# Patient Record
Sex: MALE | Race: WHITE | Employment: FULL TIME | URBAN - NONMETROPOLITAN AREA
[De-identification: names, ages, dates, MRNs, and addresses within clinical notes are randomized per-mention and may not be internally consistent; named-entity substitution may affect disease eponyms.]

---

## 2017-02-28 ENCOUNTER — HOSPITAL ENCOUNTER (OUTPATIENT)
Dept: OCCUPATIONAL THERAPY | Age: 47
Discharge: HOME | End: 2017-02-28
Attending: PHYSICIAN ASSISTANT | Admitting: PHYSICIAN ASSISTANT

## 2017-02-28 LAB
ALBUMIN SERPL-MCNC: 4.1 GM/DL (ref 3.4–5)
ALP BLD-CCNC: 62 IU/L (ref 40–129)
ALT SERPL-CCNC: 15 U/L (ref 10–40)
ANION GAP SERPL CALCULATED.3IONS-SCNC: 10 MMOL/L (ref 4–16)
AST SERPL-CCNC: 14 IU/L (ref 15–37)
BASOPHILS ABSOLUTE: 0 K/CU MM
BASOPHILS RELATIVE PERCENT: 0.5 % (ref 0–1)
BILIRUB SERPL-MCNC: 0.6 MG/DL (ref 0–1)
BILIRUBIN DIRECT: 0.2 MG/DL (ref 0–0.3)
BILIRUBIN, INDIRECT: 0.4 MG/DL (ref 0–0.7)
BUN BLDV-MCNC: 11 MG/DL (ref 6–23)
CALCIUM SERPL-MCNC: 9 MG/DL (ref 8.3–10.6)
CHLORIDE BLD-SCNC: 99 MMOL/L (ref 99–110)
CHOLESTEROL: 203 MG/DL
CO2: 26 MMOL/L (ref 21–32)
CREAT SERPL-MCNC: 1 MG/DL (ref 0.9–1.3)
DIFFERENTIAL TYPE: ABNORMAL
EKG ATRIAL RATE: 70 BPM
EKG DIAGNOSIS: NORMAL
EKG P AXIS: 42 DEGREES
EKG P-R INTERVAL: 184 MS
EKG Q-T INTERVAL: 396 MS
EKG QRS DURATION: 88 MS
EKG QTC CALCULATION (BAZETT): 427 MS
EKG R AXIS: 33 DEGREES
EKG T AXIS: -4 DEGREES
EKG VENTRICULAR RATE: 70 BPM
EOSINOPHILS ABSOLUTE: 0.1 K/CU MM
EOSINOPHILS RELATIVE PERCENT: 1.9 % (ref 0–3)
GFR AFRICAN AMERICAN: >60 ML/MIN/1.73M2
GFR NON-AFRICAN AMERICAN: >60 ML/MIN/1.73M2
GLUCOSE BLD-MCNC: 112 MG/DL (ref 70–140)
HCT VFR BLD CALC: 45.1 % (ref 42–52)
HDLC SERPL-MCNC: 91 MG/DL
HEMOGLOBIN: 14.8 GM/DL (ref 13.5–18)
IMMATURE NEUTROPHIL %: 0.5 % (ref 0–0.43)
LDL CHOLESTEROL DIRECT: 121 MG/DL
LYMPHOCYTES ABSOLUTE: 1.4 K/CU MM
LYMPHOCYTES RELATIVE PERCENT: 21.3 % (ref 24–44)
MCH RBC QN AUTO: 29.2 PG (ref 27–31)
MCHC RBC AUTO-ENTMCNC: 32.8 % (ref 32–36)
MCV RBC AUTO: 89 FL (ref 78–100)
MONOCYTES ABSOLUTE: 0.6 K/CU MM
MONOCYTES RELATIVE PERCENT: 9.8 % (ref 0–4)
PDW BLD-RTO: 12.3 % (ref 11.7–14.9)
PLATELET # BLD: 313 K/CU MM (ref 140–440)
PMV BLD AUTO: 9.9 FL (ref 7.5–11.1)
POTASSIUM SERPL-SCNC: 4.6 MMOL/L (ref 3.5–5.1)
RBC # BLD: 5.07 M/CU MM (ref 4.6–6.2)
SEGMENTED NEUTROPHILS ABSOLUTE COUNT: 4.2 K/CU MM
SEGMENTED NEUTROPHILS RELATIVE PERCENT: 66 % (ref 36–66)
SODIUM BLD-SCNC: 135 MMOL/L (ref 135–145)
TOTAL IMMATURE NEUTOROPHIL: 0.03 K/CU MM
TOTAL PROTEIN: 7.3 GM/DL (ref 6.4–8.2)
TRIGL SERPL-MCNC: 72 MG/DL
WBC # BLD: 6.4 K/CU MM (ref 4–10.5)

## 2018-03-08 ENCOUNTER — HOSPITAL ENCOUNTER (OUTPATIENT)
Dept: OCCUPATIONAL THERAPY | Age: 48
Discharge: HOME | End: 2018-03-08
Attending: PREVENTIVE MEDICINE | Admitting: PREVENTIVE MEDICINE

## 2018-03-08 LAB
ANION GAP SERPL CALCULATED.3IONS-SCNC: 6 MMOL/L (ref 4–16)
BASOPHILS ABSOLUTE: 0 K/CU MM
BASOPHILS RELATIVE PERCENT: 0.4 % (ref 0–1)
BUN BLDV-MCNC: 12 MG/DL (ref 6–23)
CALCIUM SERPL-MCNC: 9.2 MG/DL (ref 8.3–10.6)
CHLORIDE BLD-SCNC: 99 MMOL/L (ref 99–110)
CHOLESTEROL, FASTING: 162 MG/DL
CO2: 33 MMOL/L (ref 21–32)
CREAT SERPL-MCNC: 1 MG/DL (ref 0.9–1.3)
DIFFERENTIAL TYPE: ABNORMAL
EKG ATRIAL RATE: 64 BPM
EKG DIAGNOSIS: NORMAL
EKG P AXIS: 40 DEGREES
EKG P-R INTERVAL: 174 MS
EKG Q-T INTERVAL: 402 MS
EKG QRS DURATION: 92 MS
EKG QTC CALCULATION (BAZETT): 414 MS
EKG R AXIS: 14 DEGREES
EKG T AXIS: -23 DEGREES
EKG VENTRICULAR RATE: 64 BPM
EOSINOPHILS ABSOLUTE: 0 K/CU MM
EOSINOPHILS RELATIVE PERCENT: 0.6 % (ref 0–3)
GFR AFRICAN AMERICAN: >60 ML/MIN/1.73M2
GFR NON-AFRICAN AMERICAN: >60 ML/MIN/1.73M2
GLUCOSE FASTING: 105 MG/DL (ref 70–99)
HCT VFR BLD CALC: 44.5 % (ref 42–52)
HDLC SERPL-MCNC: 85 MG/DL
HEMOGLOBIN: 14.6 GM/DL (ref 13.5–18)
IMMATURE NEUTROPHIL %: 0.4 % (ref 0–0.43)
LDL CHOLESTEROL DIRECT: 78 MG/DL
LYMPHOCYTES ABSOLUTE: 1.1 K/CU MM
LYMPHOCYTES RELATIVE PERCENT: 20.5 % (ref 24–44)
MCH RBC QN AUTO: 29.2 PG (ref 27–31)
MCHC RBC AUTO-ENTMCNC: 32.8 % (ref 32–36)
MCV RBC AUTO: 89 FL (ref 78–100)
MONOCYTES ABSOLUTE: 0.9 K/CU MM
MONOCYTES RELATIVE PERCENT: 16.6 % (ref 0–4)
PDW BLD-RTO: 12.2 % (ref 11.7–14.9)
PLATELET # BLD: 269 K/CU MM (ref 140–440)
PMV BLD AUTO: 9.7 FL (ref 7.5–11.1)
POTASSIUM SERPL-SCNC: 4.3 MMOL/L (ref 3.5–5.1)
RBC # BLD: 5 M/CU MM (ref 4.6–6.2)
SEGMENTED NEUTROPHILS ABSOLUTE COUNT: 3.1 K/CU MM
SEGMENTED NEUTROPHILS RELATIVE PERCENT: 61.5 % (ref 36–66)
SODIUM BLD-SCNC: 138 MMOL/L (ref 135–145)
TOTAL IMMATURE NEUTOROPHIL: 0.02 K/CU MM
TRIGLYCERIDE, FASTING: 41 MG/DL
WBC # BLD: 5.1 K/CU MM (ref 4–10.5)

## 2018-04-03 ENCOUNTER — HOSPITAL ENCOUNTER (OUTPATIENT)
Dept: OTHER | Age: 48
Discharge: HOME | End: 2018-04-03

## 2019-03-04 ENCOUNTER — HOSPITAL ENCOUNTER (OUTPATIENT)
Age: 49
Discharge: HOME | End: 2019-03-04

## 2019-03-04 LAB
ALBUMIN SERPL-MCNC: 4.5 GM/DL (ref 3.4–5)
ALP BLD-CCNC: 66 IU/L (ref 40–129)
ALT SERPL-CCNC: 16 U/L (ref 10–40)
ANION GAP SERPL CALCULATED.3IONS-SCNC: 12 MMOL/L (ref 4–16)
AST SERPL-CCNC: 13 IU/L (ref 15–37)
BASOPHILS ABSOLUTE: 0.1 K/CU MM
BASOPHILS RELATIVE PERCENT: 0.8 % (ref 0–1)
BILIRUB SERPL-MCNC: 0.5 MG/DL (ref 0–1)
BILIRUBIN DIRECT: 0.2 MG/DL (ref 0–0.3)
BILIRUBIN, INDIRECT: 0.3 MG/DL (ref 0–0.7)
BUN BLDV-MCNC: 10 MG/DL (ref 6–23)
CALCIUM SERPL-MCNC: 9.4 MG/DL (ref 8.3–10.6)
CHLORIDE BLD-SCNC: 101 MMOL/L (ref 99–110)
CHOLESTEROL, FASTING: 228 MG/DL
CO2: 30 MMOL/L (ref 21–32)
CREAT SERPL-MCNC: 1 MG/DL (ref 0.9–1.3)
DIFFERENTIAL TYPE: ABNORMAL
EKG ATRIAL RATE: 77 BPM
EKG DIAGNOSIS: NORMAL
EKG P AXIS: 51 DEGREES
EKG P-R INTERVAL: 184 MS
EKG Q-T INTERVAL: 388 MS
EKG QRS DURATION: 90 MS
EKG QTC CALCULATION (BAZETT): 439 MS
EKG R AXIS: 17 DEGREES
EKG T AXIS: 3 DEGREES
EKG VENTRICULAR RATE: 77 BPM
EOSINOPHILS ABSOLUTE: 0.1 K/CU MM
EOSINOPHILS RELATIVE PERCENT: 1.1 % (ref 0–3)
GFR AFRICAN AMERICAN: >60 ML/MIN/1.73M2
GFR NON-AFRICAN AMERICAN: >60 ML/MIN/1.73M2
GLUCOSE FASTING: 105 MG/DL (ref 70–99)
HCT VFR BLD CALC: 47.5 % (ref 42–52)
HDLC SERPL-MCNC: 99 MG/DL
HEMOGLOBIN: 15.3 GM/DL (ref 13.5–18)
IMMATURE NEUTROPHIL %: 0.4 % (ref 0–0.43)
LDL CHOLESTEROL DIRECT: 125 MG/DL
LYMPHOCYTES ABSOLUTE: 1.4 K/CU MM
LYMPHOCYTES RELATIVE PERCENT: 18.2 % (ref 24–44)
MCH RBC QN AUTO: 29.3 PG (ref 27–31)
MCHC RBC AUTO-ENTMCNC: 32.2 % (ref 32–36)
MCV RBC AUTO: 91 FL (ref 78–100)
MONOCYTES ABSOLUTE: 0.7 K/CU MM
MONOCYTES RELATIVE PERCENT: 8.7 % (ref 0–4)
PDW BLD-RTO: 11.9 % (ref 11.7–14.9)
PLATELET # BLD: 339 K/CU MM (ref 140–440)
PMV BLD AUTO: 9.4 FL (ref 7.5–11.1)
POTASSIUM SERPL-SCNC: 4.9 MMOL/L (ref 3.5–5.1)
RBC # BLD: 5.22 M/CU MM (ref 4.6–6.2)
SEGMENTED NEUTROPHILS ABSOLUTE COUNT: 5.3 K/CU MM
SEGMENTED NEUTROPHILS RELATIVE PERCENT: 70.8 % (ref 36–66)
SODIUM BLD-SCNC: 143 MMOL/L (ref 135–145)
TOTAL IMMATURE NEUTOROPHIL: 0.03 K/CU MM
TOTAL PROTEIN: 8 GM/DL (ref 6.4–8.2)
TRIGLYCERIDE, FASTING: 61 MG/DL
WBC # BLD: 7.4 K/CU MM (ref 4–10.5)

## 2019-03-04 PROCEDURE — 93017 CV STRESS TEST TRACING ONLY: CPT

## 2019-03-04 PROCEDURE — 82248 BILIRUBIN DIRECT: CPT

## 2019-03-04 PROCEDURE — 36415 COLL VENOUS BLD VENIPUNCTURE: CPT

## 2019-03-04 PROCEDURE — 93010 ELECTROCARDIOGRAM REPORT: CPT | Performed by: INTERNAL MEDICINE

## 2019-03-04 PROCEDURE — 80053 COMPREHEN METABOLIC PANEL: CPT

## 2019-03-04 PROCEDURE — 85025 COMPLETE CBC W/AUTO DIFF WBC: CPT

## 2019-03-04 PROCEDURE — 93005 ELECTROCARDIOGRAM TRACING: CPT | Performed by: PHYSICIAN ASSISTANT

## 2019-03-04 PROCEDURE — 80061 LIPID PANEL: CPT

## 2020-08-17 ENCOUNTER — TELEPHONE (OUTPATIENT)
Dept: ORTHOPEDIC SURGERY | Age: 50
End: 2020-08-17

## 2020-09-04 ENCOUNTER — INITIAL CONSULT (OUTPATIENT)
Dept: PULMONOLOGY | Age: 50
End: 2020-09-04
Payer: COMMERCIAL

## 2020-09-04 VITALS
SYSTOLIC BLOOD PRESSURE: 120 MMHG | HEIGHT: 70 IN | RESPIRATION RATE: 16 BRPM | BODY MASS INDEX: 27.52 KG/M2 | TEMPERATURE: 97.8 F | HEART RATE: 82 BPM | DIASTOLIC BLOOD PRESSURE: 84 MMHG | WEIGHT: 192.2 LBS | OXYGEN SATURATION: 98 %

## 2020-09-04 PROCEDURE — 99203 OFFICE O/P NEW LOW 30 MIN: CPT | Performed by: NURSE PRACTITIONER

## 2020-09-04 RX ORDER — ASPIRIN 325 MG
325 TABLET ORAL DAILY
COMMUNITY
End: 2021-10-19

## 2020-09-04 NOTE — PROGRESS NOTES
Subjective:   CHIEF COMPLAINT / HPI:       Ana Laura Santamaria is a 48 y.o. male presents today to the pulmonary clinic for abnormal findings on chest x-ray that was performed during routine work physical for [facility]. He states he had a chest x-ray obtained at [facility] as part of his work physical in March of this year. He was told that he had a lung mass but had no other additional information. He does not have a copy of the x-ray or the report with him today. He states he was told that he needed to follow-up for an abnormal findings however they did not refer him to anyone at that time. He did not have a primary care provider at that time due to skilled nursing of his PCP. He has since has reestablished with a PCP in [facility] but cannot recall the provider's name. He is not on any respiratory medications at this time. He states he will notice shortness of breath when he bends over to pick something off the floor, tie his shoes. He denies conversational dyspnea or dyspnea with exertion. Juliette is a former smoker quit in 0. Started at age 23 smoked less than 2 packs of cigarettes a week. When he stopped smoking cigarettes he did take start using raw tobacco chew. He continues to use chew, 1 - 2 cans per week. Rochelle Mcmillan states he does have chronic back pain for which she sees a chiropractor. He states occasionally he will have numbness and tingling that goes down along his left leg. He states he has been told \"I have nodules in my back\". He states that he has great relief after chiropractic manipulations. Rochelle Mcmillan states they are practicing social distancing, wearing a mask when out in public, washing hands frequently or using hand . Denies any fever, chills, malaise, change in sensation of taste or smell, headache or lightheadedness.   Denies any known contacts with persons with respiratory infection, positive for coronavirus or under investigation for possible coronavirus exposure. Influenza immunization: refused last year, did receive in 2018  Pneumococcal immunization: has not receieved        Past Medical History:  No past medical history on file. Current Medications:      Current Outpatient Medications:     aspirin 325 MG tablet, Take 325 mg by mouth daily, Disp: , Rfl:     No Known Allergies    Social History:    Social History     Socioeconomic History    Marital status: Unknown     Spouse name: None    Number of children: None    Years of education: None    Highest education level: None   Occupational History    None   Social Needs    Financial resource strain: None    Food insecurity     Worry: None     Inability: None    Transportation needs     Medical: None     Non-medical: None   Tobacco Use    Smoking status: Former Smoker     Packs/day: 0.25     Years: 8.00     Pack years: 2.00     Last attempt to quit: 1996     Years since quittin.8    Smokeless tobacco: Current User   Substance and Sexual Activity    Alcohol use: None    Drug use: None    Sexual activity: None   Lifestyle    Physical activity     Days per week: None     Minutes per session: None    Stress: None   Relationships    Social connections     Talks on phone: None     Gets together: None     Attends Congregational service: None     Active member of club or organization: None     Attends meetings of clubs or organizations: None     Relationship status: None    Intimate partner violence     Fear of current or ex partner: None     Emotionally abused: None     Physically abused: None     Forced sexual activity: None   Other Topics Concern    None   Social History Narrative    None       Family History:    No family history on file. REVIEW OF SYSTEMS:    CONSTITUTIONAL:  negative for fevers, chills, diaphoresis, activity change, appetite change, night sweats and unexpected weight change.    HEENT:  negative for hearing loss,  sinus pressure, nasal congestion, epistaxis and snoring  RESPIRATORY:  + SOB when bending over, denies wheezes, dneies cough  CARDIOVASCULAR:  Negative for chest pain, palpitations, exertional chest pressure/discomfort, edema, syncope  GASTROINTESTINAL: negative for nausea, vomiting, diarrhea, constipation, blood in stool and abdominal pain  GENITOURINARY:  negative for frequency, dysuria and hematuria  HEMATOLOGIC/LYMPHATIC:  negative for easy bruising, bleeding and lymphadenopathy  ALLERGIC/IMMUNOLOGIC:  negative for recurrent infections, angioedema, anaphylaxis and drug reaction  MUSCULOSKELETAL:  negative for  pain, joint swelling, decreased range of motion and muscle weakness  NEURO: negative for headache, AMS, decrease sensations  SKIN: Negative for rashes or lesions      Objective:   VITALS:   Vitals:    09/04/20 0957   BP: 120/84   Pulse: 82   Resp: 16   Temp: 97.8 °F (36.6 °C)   TempSrc: Oral   SpO2: 98%   Weight: 192 lb 3.2 oz (87.2 kg)   Height: 5' 10\" (1.778 m)        Inches  Deputy -    MALLAMPATI: 2  Body mass index is 27.58 kg/m². PHYSICAL EXAM:    CONSTITUTIONAL:  awake, alert, cooperative, no apparent distress, and appears stated age  HEENT:  Supple and nontender,  trachea midline, no adenopathy, thyroid normal, no JVD, no wheezing or stridor over neck  CHEST: Chest expansion equal and symmetrical, no intercostal retraction, no increased work of breathing  LUNGS: Bilateral breath sounds clear and equal to auscultation, good air movement, no use of accessory muscles to support respiratory effort. No forced expiratory wheeze, no rhonchi, no rales, no cough   CARDIOVASCULAR: Normal S1 and S2 , no murmurs or gallops ,no pericardial rubs  ABDOMEN:  normal bowel sounds, non-distended and no masses palpated, and no tenderness to palpation. LYMPHADENOPATHY:  no axillary or supraclavicular adenopathy.  No cervical adenopathy  EXTREMITIES: No edema, no inflammation, no tenderness, no clubbing of digits  NEURO: Oriented X 3, No focal deficits  SKIN: warm and dry    LAB:CBC with Differential:    Lab Results   Component Value Date    WBC 7.4 03/04/2019    RBC 5.22 03/04/2019    HGB 15.3 03/04/2019    HCT 47.5 03/04/2019     03/04/2019    MCV 91.0 03/04/2019    MCH 29.3 03/04/2019    MCHC 32.2 03/04/2019    RDW 11.9 03/04/2019    SEGSPCT 70.8 03/04/2019    LYMPHOPCT 18.2 03/04/2019    MONOPCT 8.7 03/04/2019    EOSPCT 1.6 02/10/2012    BASOPCT 0.8 03/04/2019    MONOSABS 0.7 03/04/2019    LYMPHSABS 1.4 03/04/2019    EOSABS 0.1 03/04/2019    BASOSABS 0.1 03/04/2019    DIFFTYPE AUTOMATED DIFFERENTIAL 03/04/2019     BMP:    Lab Results   Component Value Date     03/04/2019    K 4.9 03/04/2019     03/04/2019    CO2 30 03/04/2019    BUN 10 03/04/2019    CREATININE 1.0 03/04/2019    CALCIUM 9.4 03/04/2019    GFRAA >60 03/04/2019    LABGLOM >60 03/04/2019    GLUCOSE 112 02/28/2017     Hepatic Function Panel:    Lab Results   Component Value Date    ALKPHOS 66 03/04/2019    ALT 16 03/04/2019    AST 13 03/04/2019    PROT 8.0 03/04/2019    BILITOT 0.5 03/04/2019    BILIDIR 0.2 03/04/2019    IBILI 0.3 03/04/2019     ABG:  No results found for: [place], BEART, A3GVWNWE, PHART, THGBART, VCR3CZH, PO2ART, ODA9RSC    RADIOLOGY:    PFT: Completed at [facility], 3/17/2020  FVC 98% predicted, FEV1 91% predicted, FEV1/FVC 93% predicted, FEF 25-75% 69% predicted. No bronchodilators were administered. Assessment      1. Lung nodules  I do not currently have access to CT report or CT imaging.   I do have report from his occupational physical that notes that he had pulmonary nodules that needed follow-up and referral to pulmonologist.  He did complete a pulmonary function test however it was a limited study and recommended complete study with bronchodilator use for which we will schedule at [facility].  [facility] a [facility] we will proceed with a CT scan of his chest.    2. Current nicotine use  While he has stopped smoking nicotine products he does continue to use were all nicotine in the form of chew. We have discussed the importance of stopping in regards to his overall health especially cardiopulmonary. He states he feels that he can stop without difficulty    3. Healthcare maintenance  We have discussed the need to maintain yearly flu immunization, pneumococcal vaccination. We have discussed Coronavirus precaution including: social distancing when needing to be in public, handwashing practice, wiping items touched in public such as gas pumps, door handles, shopping carts, etc. Self monitoring for infection - fever, chills, cough, SOB. Should they develop symptoms they should call office for further instructions. 4. Preop testing  He is aware that he will need to have a preprocedure COVID-19 test prior to his pulmonary function test.  [place] [facility] [place] 10 [place] prior to his pulmonary function test.  He is aware that he will need to self isolate between his COVID test and his pulmonary function test        Return in about 4 weeks (around 10/2/2020) for CT Chest, PFT, Follow Up. This dictation was performed with a verbal recognition program and it was checked for errors. It is possible that there are still dictated errors within this office note. Any errors should be brought immediately to my attention for correction. All efforts were made to ensure that this office note is accurate.        Electronically signed by BELGICA Granados CNP on 9/4/2020 at 10:46 AM

## 2020-10-14 ENCOUNTER — TELEPHONE (OUTPATIENT)
Dept: PULMONOLOGY | Age: 50
End: 2020-10-14

## 2020-10-14 ENCOUNTER — HOSPITAL ENCOUNTER (OUTPATIENT)
Dept: CT IMAGING | Age: 50
Discharge: HOME | End: 2020-10-14
Payer: COMMERCIAL

## 2020-10-14 PROCEDURE — 71250 CT THORAX DX C-: CPT

## 2020-10-14 NOTE — TELEPHONE ENCOUNTER
Andrew Johnson from [facility] in [place] called in regards to pt being there for a tets and the order not being correct

## 2020-10-15 NOTE — TELEPHONE ENCOUNTER
Called the number left by Bel Morfin from [place] on 10/14/2020 @430. Phone rang and then switched to a fax line. [facility] and was transferred to the radiology department. No one answer so left a message requesting that Bel Morfin call our office back in regards to the patient that was awaiting his CT.

## 2020-10-15 NOTE — PROGRESS NOTES
Spoke with Claudette Frederic and reviewed his CT findings, we will repeat CT in 6 months. Claudette Frederic stated that he is scheduled for a pulmonary function test next week in [place] and thought he was having his testing completed in [place]. I will call the [facility] pulmonary function lab and get his appointment moved to [facility]. He is aware he will need to complete a Covid test 7 days prior to his pulmonary function test.  He is aware that he can walk into the [place] [facility] [place] [facility] to have that testing anytime.

## 2020-10-16 ENCOUNTER — TELEPHONE (OUTPATIENT)
Dept: PULMONOLOGY | Age: 50
End: 2020-10-16

## 2020-10-19 NOTE — PROGRESS NOTES
Left message at home voicemail and cellular voicemail to check if pt has gotten COVID testing done. There are not results showing that pt did obtain their pre-testing COVID testing.  Requested call back

## 2020-10-21 ENCOUNTER — HOSPITAL ENCOUNTER (OUTPATIENT)
Dept: PULMONOLOGY | Age: 50
Discharge: HOME | End: 2020-10-21
Payer: COMMERCIAL

## 2020-10-30 LAB
DLCO %PRED: NORMAL %
DLCO PRED: NORMAL
DLCO/VA %PRED: NORMAL
DLCO/VA PRED: NORMAL
DLCO/VA: NORMAL
DLCO: NORMAL
EXPIRATORY TIME-POST: NORMAL
EXPIRATORY TIME: NORMAL
FEF 25-75% %CHNG: NORMAL
FEF 25-75% %PRED-POST: NORMAL
FEF 25-75% %PRED-PRE: NORMAL
FEF 25-75% PRED: NORMAL
FEF 25-75%-POST: NORMAL
FEF 25-75%-PRE: NORMAL
FEV1 %PRED-POST: 85.5 %
FEV1 %PRED-PRE: 77.3 %
FEV1 PRED: 3.93 L
FEV1-POST: 3.36 L
FEV1-PRE: 3.04 L
FEV1/FVC %PRED-POST: 105.7 %
FEV1/FVC %PRED-PRE: 103.3 %
FEV1/FVC PRED: 77.7 %
FEV1/FVC-POST: 82.2 %
FEV1/FVC-PRE: 80.3 %
FVC %PRED-POST: 80.8 L
FVC %PRED-PRE: 74.8 %
FVC PRED: 5.06 L
FVC-POST: 4.09 L
FVC-PRE: 3.79 L
GAW %PRED: NORMAL
GAW PRED: NORMAL
GAW: NORMAL
IC %PRED: NORMAL
IC PRED: NORMAL
IC: NORMAL
MEP: NORMAL
MIP: NORMAL
MVV %PRED-PRE: NORMAL
MVV PRED: NORMAL
MVV-PRE: NORMAL
PEF %PRED-POST: NORMAL
PEF %PRED-PRE: NORMAL
PEF PRED: NORMAL
PEF%CHNG: NORMAL
PEF-POST: NORMAL
PEF-PRE: NORMAL
RAW %PRED: NORMAL
RAW PRED: NORMAL
RAW: NORMAL
RV %PRED: NORMAL
RV PRED: NORMAL
RV: NORMAL
SVC %PRED: NORMAL
SVC PRED: NORMAL
SVC: NORMAL
TLC %PRED: NORMAL %
TLC PRED: NORMAL
TLC: NORMAL
VA %PRED: NORMAL
VA PRED: NORMAL
VA: NORMAL
VTG %PRED: NORMAL
VTG PRED: NORMAL
VTG: NORMAL

## 2020-10-30 ASSESSMENT — PULMONARY FUNCTION TESTS
FEV1_POST: 3.36
FEV1/FVC_PRE: 80.3
FEV1_PERCENT_PREDICTED_POST: 85.5
FEV1/FVC_PERCENT_PREDICTED_POST: 105.7
FEV1_PRE: 3.04
FVC_POST: 4.09
FVC_PREDICTED: 5.06
FEV1_PERCENT_PREDICTED_PRE: 77.3
FEV1/FVC_PREDICTED: 77.7
FVC_PRE: 3.79
FEV1_PREDICTED: 3.93
FVC_PERCENT_PREDICTED_PRE: 74.8
FVC_PERCENT_PREDICTED_POST: 80.8
FEV1/FVC_POST: 82.2
FEV1/FVC_PERCENT_PREDICTED_PRE: 103.3

## 2021-09-16 ENCOUNTER — HOSPITAL ENCOUNTER (OUTPATIENT)
Dept: GENERAL RADIOLOGY | Age: 51
Discharge: HOME | End: 2021-09-16

## 2021-09-16 DIAGNOSIS — Z00.6 EXAMINATION FOR NORMAL COMPARISON FOR CLINICAL RESEARCH: ICD-10-CM

## 2021-09-17 ENCOUNTER — OFFICE VISIT (OUTPATIENT)
Dept: PULMONOLOGY | Age: 51
End: 2021-09-17
Payer: COMMERCIAL

## 2021-09-17 ENCOUNTER — TELEPHONE (OUTPATIENT)
Dept: PULMONOLOGY | Age: 51
End: 2021-09-17

## 2021-09-17 VITALS
TEMPERATURE: 97 F | WEIGHT: 191 LBS | OXYGEN SATURATION: 98 % | BODY MASS INDEX: 27.35 KG/M2 | HEART RATE: 86 BPM | DIASTOLIC BLOOD PRESSURE: 76 MMHG | HEIGHT: 70 IN | SYSTOLIC BLOOD PRESSURE: 116 MMHG

## 2021-09-17 DIAGNOSIS — R93.89 ABNORMAL CT OF THE CHEST: Primary | ICD-10-CM

## 2021-09-17 DIAGNOSIS — R91.1 INCIDENTAL LUNG NODULE, GREATER THAN OR EQUAL TO 8MM: ICD-10-CM

## 2021-09-17 DIAGNOSIS — Z77.22 TOBACCO SMOKE EXPOSURE: ICD-10-CM

## 2021-09-17 PROCEDURE — 99204 OFFICE O/P NEW MOD 45 MIN: CPT | Performed by: INTERNAL MEDICINE

## 2021-09-17 NOTE — PROGRESS NOTES
[place] [facility]  Pulmonary medicine clinic initial consult note. Patient: Monica Alexander  : 1970      Chief complaint/[place]:     Monica Alexander is a 46 y.o. 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 He was evaluated by Dr. Guille Han MD as a part of occupational health evaluation. He underwent CT scan of chest without contrast and a high-resolution cuts on 2020. He was found to have a 1.3 cm groundglass pulmonary nodule. He underwent chest CT scan on: 2020  The above chest  CT was performed at:  [facility]    He was ever seen by a pulmonologist in the past:NO  He was ever diagnosed with lung nodule in the past:NO  He was ever diagnosed with lung cancer:NO  He ever diagnosed with any extrapulmonary cancers I.e Breast,Colon etc:NO  He was ever diagnosed with COVID19 infection/Pneumonia:NO  He was ever diagnosed with Pneumonia:NO    He was ever diagnosed with following pulmonary diseases:  Bronchial asthma: NO  COPD:NO  Pulmonary fibrosis:NO  Sarcoidosis:NO  Pulmonary embolism: NO  Pulmonary hypertension:NO  Pleural effusion/s in the past:NO    He ever diagnosed with pulmonary tuberculosis in the past:NO  He ever recently exposed to any patients with tuberculosis:NO  He ever recently travelled to endemic places of tuberculosis:NO  He ever tested for PPD:NO    He ever diagnosed with connective tissue diseases including Systemic lupus Erythematosus, Rheumatoid arthritis etc:NO  Any of his first-degree family relative ever diagnosed with Lung cancer:NO  He ever exposed to radon, or uranium in the past: NO    He is currently using any oxygen supplementation at rest, exercise or during sleep/at night time:NO    He ever had a Colonoscopy performed: Negative colonoscopy couple of years back  He ever had his prostate check exam performed: Never diagnosed with a prostate cancer    His current functional status is unlimited. Wells Score:    Sign/Symptom:                  Score:    Symptoms of  DVT :    0.       (3)                                 Tachycardia:               0. (1.5)  Immobilization:           0. (1.5)  History of VTE:           0. (1)  Malignancy:                0. (1)  Hemoptysis:               0. (1)  No alternative diagnosis: 3  (3)    Total score:    3        Social History:  Occupation:  He is current working: Yes  Type of profession: Currently working at the [employer] as a  in [facility]. He worked in the [employer] for 4 years in the past.                       History of tobacco smoking:Yes  Amount of tobacco smokin pack of cigarettes per week  Years of tobacco smokin. Quit smoking: Yes         Quit year: 56  Current smoker: No.         History of recreational or IV drug use in the past:NO  History of Alcohol use: Yes. 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 He used to work on [employer]  History of exposure to farm dust: Yes. He worked for 6 months in a grain elevator.   History of recent travel to long distances: NO  History of exposure to birds, pigeons, or chickens in the past:NO  Pet animals at home:Yes  Dogs: 1  Cats: 1    History of pulmonary embolism in the past: No            History of DVT in the past:No                        Review of Systems: General/Constitutional: He gained approximately 20 pounds of weight in the last 2 years with a normal appetite. No fever or chills. HENT: Negative. Eyes: Negative. Upper respiratory tract: No nasal stuffiness or post nasal drip. Lower respiratory tract/ lungs: No cough or sputum production. No hemoptysis. He is having shortness of breath when he is bending forward  Cardiovascular: No palpitations or chest pain. Gastrointestinal: No nausea or vomiting. Neurological: No focal neurologiacal weakness. Extremities: No edema. Musculoskeletal: No complaints. Genitourinary: No complaints. Hematological: Negative. Psychiatric/Behavioral: Negative. Skin: No itching. Current Medications:          No past medical history on file. No past surgical history on file. No Known Allergies    Current Outpatient Medications   Medication Sig Dispense Refill    aspirin 325 MG tablet Take 325 mg by mouth daily (Patient not taking: Reported on 9/17/2021)       No current facility-administered medications for this visit. No family history on file. Physical Exam     VITALS:  /76 (Site: Left Upper Arm, Position: Sitting, Cuff Size: Medium Adult)   Pulse 86   Temp 97 °F (36.1 °C)   Ht 5' 10\" (1.778 m)   Wt 191 lb (86.6 kg)   SpO2 98% Comment: room air at rest  BMI 27.41 kg/m²   Nursing note and vitals reviewed. Constitutional: Patient appears moderately built and moderately nourished. No distress. Patient is oriented to person, place, and time. HENT:   Head: Normocephalic and atraumatic. Right Ear: External ear normal.   Left Ear: External ear normal.   Mouth/Throat: Oropharynx is clear and moist.  No oral thrush. Eyes: Conjunctivae are normal. Pupils are equal, round, and reactive to light. No scleral icterus. Neck: Neck supple. No JVD present. No tracheal deviation present. Cardiovascular: Normal rate, regular rhythm, normal heart sounds. No murmur heard.    Pulmonary/Chest: Effort normal and breath sounds normal. No stridor. No respiratory distress. No wheezes. No rales. Patient exhibits no tenderness. Abdominal: Soft. Patient exhibits no distension. No tenderness. Musculoskeletal: Normal range of motion. Extremities: Patient exhibits no edema and no tenderness. Lymphadenopathy:  No cervical adenopathy. Neurological: Patient is alert and oriented to person, place, and time. Skin: Skin is warm and dry. Patient is not diaphoretic. Psychiatric: Patient  has a normal mood and affect. Patient behavior is normal.       Diagnostic Data:    Radiological Data:              CT scan of chest without contrast with high-resolution cuts: Thu Oct 15, 2020  8:22 AM   EXAMINATION: CT IMAGES OF THE CHEST WITHOUT CONTRAST, HIGH RESOLUTION 10/14/2020   1. Ground-glass pulmonary nodule in the medial right lower lobe measuring up to 1.3 cm. No solid components identified. Recommendations described below. 2. Multiple large calcified mediastinal lymph nodes, likely sequela of prior granulomatous disease. RECOMMENDATIONS: Recommend a non-contrast Chest CT at 6-12 months to confirm persistence, then additional non-contrast Chest CTs every 2 years until 5 years. If nodule grows or develops solid component(s), consider resection. Pulmonary function tests: Performed on 17 March 2020 as a part of his work. Assessment:  -Ground-glass pulmonary nodule in the medial right lower lobe measuring up to 1.3 cm.  -History of tobacco smoking for 6 years with 1 pack of cigarettes per week. He quit smoking in 1996.  -He is having shortness of breath when he is bending forward- ? Etiology.     Recommendations/Plan:  -Please obtain his full pulmonary function tests along with methacholine challenge test from [facility] for review.  -Schedule patient for CT scan of chest with IV contrast in 1month to follow abnormal CT Chest with Ground-glass pulmonary nodule in the medial right lower lobe measuring up to 1.3 cm.  -He was advised to submit his CT scan of chest films on a CD dated 15 October 2020. CT of chest was performed at [facility] to [address] [address] radiology department to load into PACS to compare with CT scan of chest going to be performed next month. Margi Otero was advised to make early appointment with my clinic if he develops any constitutional symptoms including loss of weight, poor appetite or hemoptysis. He verbalizes under standing.  -He was advised to have a full pulmonary function tests to further evaluate his dyspnea when bending forward. Patient refused to go for full pulmonary function tests at this time.  -He was also offered to go for a cardiology evaluation to further evaluate his shortness of breath when he is bending forward with the development of chest pain on the left side of the back. Patient at this time did not want to go for cardiology evaluation.  -He also request me to hold off on sleep medicine evaluation to evaluate for sleep apnea at this time. - Schedule patient for follow up with my clinic in 1month with recommended test I.e CT chest with contrast. Patient advised to make early appointment if needed. - Patient and his wife were educated about my impression and plan. They verbalizes understanding.      -I personally reviewed updated the Past medical hx, Past surgical hx,Social hx, Family hx, Medications, Allergies in the discrete data section of the patient chart along with labs, Pulmonary medicine,Sleep medicine related, Pathological, Microbiological and Radiological investigations.

## 2021-09-17 NOTE — TELEPHONE ENCOUNTER
Spoke with Linette in medical records at [facility] and she states patient has not had a MCCT done.

## 2021-09-17 NOTE — PROGRESS NOTES
Neck Circumference -   17  Mallampati - 2    Lung Nodule Screening     [] Qualifies    [] Does not qualify   [] Declined    [] Completed

## 2021-09-17 NOTE — PATIENT INSTRUCTIONS
Recommendations/Plan:  -Please obtain his full pulmonary function tests along with methacholine challenge test from [facility] for review.  -Schedule patient for CT scan of chest with IV contrast in 1month to follow abnormal CT Chest with Ground-glass pulmonary nodule in the medial right lower lobe measuring up to 1.3 cm.  -He was advised to submit his CT scan of chest films on a CD dated 15 October 2020. CT of chest was performed at [facility] to [address] [place] [address] radiology department to load into PACS to compare with CT scan of chest going to be performed next month. Edson Oro was advised to make early appointment with my clinic if he develops any constitutional symptoms including loss of weight, poor appetite or hemoptysis. He verbalizes under standing.  -He was advised to have a full pulmonary function tests to further evaluate his dyspnea when bending forward. Patient refused to go for full pulmonary function tests at this time.  -He was also offered to go for a cardiology evaluation to further evaluate his shortness of breath when he is bending forward with the development of chest pain on the left side of the back. Patient at this time did not want to go for cardiology evaluation.  -He also request me to hold off on sleep medicine evaluation to evaluate for sleep apnea at this time. - Schedule patient for follow up with my clinic in 1month with recommended test I.e CT chest with contrast. Patient advised to make early appointment if needed. - Patient and his wife were educated about my impression and plan. They verbalizes understanding.

## 2021-09-26 NOTE — PROGRESS NOTES
cancer    His current functional status is unlimited. Social History:  Occupation:  He is current working: Yes  Type of profession: Currently working at the [employer] as a  in [facility]. He worked in the [employer] for 4 years in the past.                       History of tobacco smoking:Yes  Amount of tobacco smokin pack of cigarettes per week  Years of tobacco smokin. Quit smoking: Yes         Quit year: 56  Current smoker: No.         History of recreational or IV drug use in the past:NO  History of Alcohol use: Yes. 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 He used to work on [employer]  History of exposure to farm dust: Yes. He worked for 6 months in a grain elevator. History of recent travel to long distances: NO  History of exposure to birds, pigeons, or chickens in the past:NO  Pet animals at home:Yes  Dogs: 1  Cats: 1    History of pulmonary embolism in the past: No            History of DVT in the past:No                        Review of Systems:   General/Constitutional: he gained 3lbs of weight from the last visit. No fever or chills. HENT: Negative. Eyes: Negative. Upper respiratory tract: No nasal stuffiness or post nasal drip. Lower respiratory tract/ lungs: No cough or sputum production. He is still having dyspnea when he is trying to reach his toes with the body bending forward. He denies any dyspnea at rest or with exercise exertion  Cardiovascular: No palpitations or chest pain. Gastrointestinal: No nausea or vomiting. Neurological: No focal neurologiacal weakness.   Extremities: No with high-resolution cuts: Thu Oct 15, 2020  8:22 AM   EXAMINATION: CT IMAGES OF THE CHEST WITHOUT CONTRAST, HIGH RESOLUTION 10/14/2020   1. Ground-glass pulmonary nodule in the medial right lower lobe measuring up to 1.3 cm. No solid components identified. Recommendations described below. 2. Multiple large calcified mediastinal lymph nodes, likely sequela of prior granulomatous disease. RECOMMENDATIONS: Recommend a non-contrast Chest CT at 6-12 months to confirm persistence, then additional non-contrast Chest CTs every 2 years until 5 years. If nodule grows or develops solid component(s), consider resection. Pulmonary function tests: Performed on 17 March 2020 as a part of his work. CT Chest with Contrast:  Sat Oct 16, 2021  1:44 PM   Narrative PROCEDURE: CT CHEST W CONTRAST   Interval clearance of the previously seen groundglass opacity right lower lobe. 2. Possible hepatic steatosis. Full PFTS:   Not done      Assessment:  -Ground-glass pulmonary nodule in the medial right lower lobe measuring up to 1.3 cm-resolved as per the latest CT scan of chest dated 16 October 2021-most likely due to inflammatory process. .  -History of tobacco smoking for 6 years with 1 pack of cigarettes per week. He quit smoking in 1996.  -He is having shortness of breath when he is bending forward- ? Etiology. Patient refused to go for any further testing including full pulmonary function tests, MIP and MEPs. -Hepatic steatosis noticed on his recent CT scan of chest dated 16 October 2021-? Etiology      Recommendations/Plan:   -Patient was offered a referral to GI consultation to further evaluate hepatic steatosis noted on his recent CT scan of chest dated 16 October 2021. However patient refused to go for GI consultation he want to check with his family physician regarding further management of his hepatic steatosis.   Rahul Ash was advised to make early appointment with my clinic if he develops any constitutional symptoms including loss of weight, poor appetite or hemoptysis. He verbalizes under standing.  -He was advised to have a full pulmonary function tests including MEP and MIP to further evaluate his dyspnea when he is  bending forward. Patient refused to go for full pulmonary function tests including MEP and MIPs. -He was also offered to go for a cardiology evaluation to further evaluate his shortness of breath when he is bending forward with the development of chest pain on the left side of the back. Patient at this time did not want to go for cardiology evaluation.  -He was advised to loose weight by controlling diet and doing exercise once cleared by his family physician.   -He also request me to hold off on sleep medicine evaluation to evaluate for sleep apnea at this time. - Schedule patient for follow up with my clinic on PRN/As needed basis for pulmonary/lung related issues including dyspea. Patient to follow with his family physician closely. - Patient and his wife were educated about my impression and plan. They verbalizes understanding.      -I personally reviewed updated the Past medical hx, Past surgical hx,Social hx, Family hx, Medications, Allergies in the discrete data section of the patient chart along with labs, Pulmonary medicine,Sleep medicine related, Pathological, Microbiological and Radiological investigations.

## 2021-10-15 ENCOUNTER — HOSPITAL ENCOUNTER (OUTPATIENT)
Dept: CT IMAGING | Age: 51
Discharge: HOME | End: 2021-10-15

## 2021-10-15 DIAGNOSIS — Z00.6 EXAMINATION FOR NORMAL COMPARISON FOR CLINICAL RESEARCH: ICD-10-CM

## 2021-10-16 ENCOUNTER — HOSPITAL ENCOUNTER (OUTPATIENT)
Dept: CT IMAGING | Age: 51
Discharge: HOME | End: 2021-10-16
Payer: COMMERCIAL

## 2021-10-16 DIAGNOSIS — R93.89 ABNORMAL CT OF THE CHEST: ICD-10-CM

## 2021-10-16 DIAGNOSIS — Z77.22 TOBACCO SMOKE EXPOSURE: ICD-10-CM

## 2021-10-16 DIAGNOSIS — R91.1 INCIDENTAL LUNG NODULE, GREATER THAN OR EQUAL TO 8MM: ICD-10-CM

## 2021-10-16 PROCEDURE — 6360000004 HC RX CONTRAST MEDICATION: Performed by: INTERNAL MEDICINE

## 2021-10-16 PROCEDURE — 71260 CT THORAX DX C+: CPT

## 2021-10-16 RX ADMIN — IOPAMIDOL 80 ML: 755 INJECTION, SOLUTION INTRAVENOUS at 09:00

## 2021-10-19 ENCOUNTER — OFFICE VISIT (OUTPATIENT)
Dept: PULMONOLOGY | Age: 51
End: 2021-10-19
Payer: COMMERCIAL

## 2021-10-19 VITALS
DIASTOLIC BLOOD PRESSURE: 72 MMHG | BODY MASS INDEX: 27.77 KG/M2 | OXYGEN SATURATION: 98 % | HEIGHT: 70 IN | WEIGHT: 194 LBS | SYSTOLIC BLOOD PRESSURE: 118 MMHG | TEMPERATURE: 97.9 F | HEART RATE: 76 BPM

## 2021-10-19 DIAGNOSIS — R91.1 INCIDENTAL LUNG NODULE, GREATER THAN OR EQUAL TO 8MM: Primary | ICD-10-CM

## 2021-10-19 DIAGNOSIS — R93.89 ABNORMAL CT OF THE CHEST: ICD-10-CM

## 2021-10-19 PROCEDURE — 99214 OFFICE O/P EST MOD 30 MIN: CPT | Performed by: INTERNAL MEDICINE

## 2021-10-19 NOTE — PATIENT INSTRUCTIONS
Recommendations/Plan:   -Patient was offered a referral to GI consultation to further evaluate hepatic steatosis noted on his recent CT scan of chest dated 16 October 2021. However patient refused to go for GI consultation he want to check with his family physician regarding further management of his hepatic steatosis. Purcell Seek was advised to make early appointment with my clinic if he develops any constitutional symptoms including loss of weight, poor appetite or hemoptysis. He verbalizes under standing.  -He was advised to have a full pulmonary function tests including MEP and MIP to further evaluate his dyspnea when he is  bending forward. Patient refused to go for full pulmonary function tests including MEP and MIPs. -He was also offered to go for a cardiology evaluation to further evaluate his shortness of breath when he is bending forward with the development of chest pain on the left side of the back. Patient at this time did not want to go for cardiology evaluation.  -He was advised to loose weight by controlling diet and doing exercise once cleared by his family physician.   -He also request me to hold off on sleep medicine evaluation to evaluate for sleep apnea at this time. - Schedule patient for follow up with my clinic on PRN/As needed basis for pulmonary/lung related issues including dyspea. Patient to follow with his family physician closely. - Patient and his wife were educated about my impression and plan. They verbalizes understanding.

## 2021-10-19 NOTE — PROGRESS NOTES
Neck Circumference -   17  Mallampati - 3    Lung Nodule Screening     [] Qualifies    [] Does not qualify   [] Declined    [] Completed